# Patient Record
Sex: FEMALE | Race: WHITE
[De-identification: names, ages, dates, MRNs, and addresses within clinical notes are randomized per-mention and may not be internally consistent; named-entity substitution may affect disease eponyms.]

---

## 2020-09-02 ENCOUNTER — HOSPITAL ENCOUNTER (OUTPATIENT)
Dept: HOSPITAL 47 - FBPOP | Age: 30
Discharge: HOME | End: 2020-09-02
Attending: OBSTETRICS & GYNECOLOGY
Payer: SELF-PAY

## 2020-09-02 VITALS
SYSTOLIC BLOOD PRESSURE: 108 MMHG | TEMPERATURE: 98.4 F | RESPIRATION RATE: 16 BRPM | HEART RATE: 74 BPM | DIASTOLIC BLOOD PRESSURE: 55 MMHG

## 2020-09-02 DIAGNOSIS — Z3A.21: ICD-10-CM

## 2020-09-02 DIAGNOSIS — O36.8121: Primary | ICD-10-CM

## 2020-09-02 LAB
BASOPHILS # BLD AUTO: 0 K/UL (ref 0–0.2)
BASOPHILS NFR BLD AUTO: 0 %
EOSINOPHIL # BLD AUTO: 0 K/UL (ref 0–0.7)
EOSINOPHIL NFR BLD AUTO: 0 %
ERYTHROCYTE [DISTWIDTH] IN BLOOD BY AUTOMATED COUNT: 3.95 M/UL (ref 3.8–5.4)
ERYTHROCYTE [DISTWIDTH] IN BLOOD: 13.2 % (ref 11.5–15.5)
HCT VFR BLD AUTO: 36 % (ref 34–46)
HGB BLD-MCNC: 12.1 GM/DL (ref 11.4–16)
LYMPHOCYTES # SPEC AUTO: 0.7 K/UL (ref 1–4.8)
LYMPHOCYTES NFR SPEC AUTO: 6 %
MCH RBC QN AUTO: 30.6 PG (ref 25–35)
MCHC RBC AUTO-ENTMCNC: 33.6 G/DL (ref 31–37)
MCV RBC AUTO: 91.3 FL (ref 80–100)
MONOCYTES # BLD AUTO: 0.5 K/UL (ref 0–1)
MONOCYTES NFR BLD AUTO: 5 %
NEUTROPHILS # BLD AUTO: 9.8 K/UL (ref 1.3–7.7)
NEUTROPHILS NFR BLD AUTO: 88 %
PH UR: 7.5 [PH] (ref 5–8)
PLATELET # BLD AUTO: 205 K/UL (ref 150–450)
SP GR UR: 1.01 (ref 1–1.03)
UROBILINOGEN UR QL STRIP: 2 MG/DL (ref ?–2)
WBC # BLD AUTO: 11.2 K/UL (ref 3.8–10.6)

## 2020-09-02 PROCEDURE — 86762 RUBELLA ANTIBODY: CPT

## 2020-09-02 PROCEDURE — 86900 BLOOD TYPING SEROLOGIC ABO: CPT

## 2020-09-02 PROCEDURE — 86901 BLOOD TYPING SEROLOGIC RH(D): CPT

## 2020-09-02 PROCEDURE — 87340 HEPATITIS B SURFACE AG IA: CPT

## 2020-09-02 PROCEDURE — 81003 URINALYSIS AUTO W/O SCOPE: CPT

## 2020-09-02 PROCEDURE — 86780 TREPONEMA PALLIDUM: CPT

## 2020-09-02 PROCEDURE — 85025 COMPLETE CBC W/AUTO DIFF WBC: CPT

## 2020-09-02 PROCEDURE — 87390 HIV-1 AG IA: CPT

## 2020-09-02 PROCEDURE — 86850 RBC ANTIBODY SCREEN: CPT

## 2020-09-02 PROCEDURE — 76805 OB US >/= 14 WKS SNGL FETUS: CPT

## 2020-09-02 PROCEDURE — 80306 DRUG TEST PRSMV INSTRMNT: CPT

## 2020-09-04 NOTE — P.MSEPDOC
Presenting Problems





- Arrival Data


Date of Arrival on Unit: 20


Time of Arrival on Unit: 17:50


Mode of Transport: Portable





- Complaint


OB-Reason for Admission/Chief Complaint: Decreased Fetal Movement


Comment: pt presents to triage from [facility] with [facility]. has been at long term 

since.  yesterday. states has been feeling baby move a lot last week and a half 

but has not felt.  any movement since yesterday. States she has been asking for 

help due to cramping. Pt.  teary and worried about baby





Prenatal Medical History





- Pregnancy Information


: 5


Para: 3


Term: 3


: 0


Abortions: Spontaneous or Elective: 1


Number of Living Children: 2





- Gestational Age


Gestational Age by SHARDA (wks/days): 21 Weeks and 6 Days





- Prenatal History


Pregnancy Complications: No Prenatal Care





Review of Systems





- Review of Systems


Constitutional: No problems


Breast: No problems


ENT: No problems


Cardiovascular: No problems


Respiratory: No problems


Gastrointestinal: No problems


Genitourinary: No problems


Musculoskeletal: No problems


Neurological: No problems


Skin: No problems





Vital Signs





- Temperature


Temperature: 98.4 F


Temperature Source: Oral





- Pulse


  ** Right


Pulse Rate: 74


Pulse Assessment Method: Automatic Cuff





- Respirations


Respiratory Rate: 16


Oxygen Delivery Method: Room Air





- Blood Pressure


  ** Right Arm


Blood Pressure: 108/55


Blood Pressure Mean: 72


Blood Pressure Source: Automatic Cuff





Medical Screen Scoring (Pre)





- Cervical Exam


Dilation: Exam Deferred


Membranes: Intact





- Uterine Contractions


Frequency: N/A





- Maternal Vital Signs


Maternal Temperature: N/A


Maternal Blood Pressure: N/A


Signs of Preeclampsia: N/A


Maternal Respirations: N/A





- Maternal Trauma


Maternal Trauma: N/A





- Fetal Assessment - Baby A


Baseline FHR: 140





- Total Score - Baby A


Total Score - Baby A: 0





- Total Score - Baby B


Total Score - Baby B: 0





- Total Score - Baby C


Total Score - Baby C: 0





- Level of Risk - Baby A


Level of Risk - Baby A: Low (0-5)





- Level of Risk - Baby B


Level of Risk - Baby B: Low (0-5)





- Level of Risk - Baby C


Level of Risk - Baby C: Low (0-5)





- Pain Assessment


Pain Scale Used: Numeric (1 - 10)


Pain Intensity: 0


Pain Management Goal: 0





Physician Notification (Pre)





- Physician Notified


Physician Notified Date: 20


Physician Notified Time: 18:12





- Notification Comment


Comment: Dr Cruz updated pts reason for visit and cited complaints. fhr 140 

with [facility].  Orders received labs and ultrasound





Disposition





- Disposition


OB Disposition: Observe, Written follow up instructions reviewed


I agree with the RN Medical Screening Exam: Yes


Risk & Benefit of care provided described in d/c instruction: Yes


Diagnosis: DECREASED FETAL MOVEMENTS, SECOND TRIMESTER, FETUS 1